# Patient Record
Sex: MALE | Race: WHITE | Employment: FULL TIME | URBAN - METROPOLITAN AREA
[De-identification: names, ages, dates, MRNs, and addresses within clinical notes are randomized per-mention and may not be internally consistent; named-entity substitution may affect disease eponyms.]

---

## 2020-01-19 ENCOUNTER — APPOINTMENT (OUTPATIENT)
Dept: GENERAL RADIOLOGY | Facility: CLINIC | Age: 40
End: 2020-01-19
Attending: PHYSICIAN ASSISTANT
Payer: COMMERCIAL

## 2020-01-19 ENCOUNTER — HOSPITAL ENCOUNTER (EMERGENCY)
Facility: CLINIC | Age: 40
Discharge: HOME | End: 2020-01-19
Attending: PHYSICIAN ASSISTANT | Admitting: PHYSICIAN ASSISTANT
Payer: COMMERCIAL

## 2020-01-19 ENCOUNTER — NURSE TRIAGE (OUTPATIENT)
Dept: NURSING | Facility: CLINIC | Age: 40
End: 2020-01-19

## 2020-01-19 VITALS
TEMPERATURE: 97.6 F | DIASTOLIC BLOOD PRESSURE: 97 MMHG | SYSTOLIC BLOOD PRESSURE: 150 MMHG | OXYGEN SATURATION: 100 % | RESPIRATION RATE: 18 BRPM

## 2020-01-19 DIAGNOSIS — S62.626A CLOSED DISPLACED FRACTURE OF MIDDLE PHALANX OF RIGHT LITTLE FINGER, INITIAL ENCOUNTER: ICD-10-CM

## 2020-01-19 PROCEDURE — 73130 X-RAY EXAM OF HAND: CPT | Mod: RT

## 2020-01-19 PROCEDURE — 99284 EMERGENCY DEPT VISIT MOD MDM: CPT | Mod: 25

## 2020-01-19 PROCEDURE — 26720 TREAT FINGER FRACTURE EACH: CPT | Mod: F9

## 2020-01-19 NOTE — ED AVS SNAPSHOT
[facility] Emergency Department  [address]  [facility] [address]  Phone:  [phone]  Fax:  [phone]                                    Johnathon Mendoza   MRN: [id number]    Department:  [facility] Emergency Department   Date of Visit:  1/19/2020           After Visit Summary Signature Page    I have received my discharge instructions, and my questions have been answered. I have discussed any challenges I see with this plan with the nurse or doctor.    ..........................................................................................................................................  Patient/Patient Representative Signature      ..........................................................................................................................................  Patient Representative Print Name and Relationship to Patient    ..................................................               ................................................  Date                                   Time    ..........................................................................................................................................  Reviewed by Signature/Title    ...................................................              ..............................................  Date                                               Time          22EPIC Rev 08/18

## 2020-01-20 NOTE — ED PROVIDER NOTES
"  History     Chief Complaint:  Hand Injury    The history is provided by the patient.      Johnathon Mendoza is a 39 year old otherwise healthy right handed male who presents alone for evaluation of a right hand injury while playing hockey earlier tonight. Patient reports that he was coming up by the hockey boards and about to exit, when he went around another player and \"must've gotten caught on something\". When he went to sit on the bench, he felt that his right fifth finger was \"out of whack.\" He states the finger now appears for straight than it did previously. He had continued swelling and pain, thus prompted his presentation. Patient has not taken anything for pain and does not report any other complaint. He has no further concerns at this time.     Allergies:  Penicillins     Medications:    The patient is not currently taking any prescribed medications.     Past Medical History:    History reviewed. No pertinent past medical history.     Past Surgical History:    History reviewed. No pertinent past surgical history.     Family History:    History reviewed. No pertinent family history.       Social History:  The patient was accompanied to the ED alone.    Review of Systems   Musculoskeletal:        Right little finger pain   All other systems reviewed and are negative.    Physical Exam     Patient Vitals for the past 24 hrs:   BP Temp Temp src Heart Rate Resp SpO2   01/19/20 2123 (!) 150/97 97.6  F (36.4  C) Oral 94 18 100 %       Physical Exam  General: Resting comfortably.  Alert and oriented.   Head:  The scalp, face, and head appear normal   Eyes:  Conjunctivae and sclerae are normal    CV:  Radial pulse intact to the right wrist.  Capillary refill is brisk in all digits of the right hand.   Resp:  No tachypnea.  No respiratory distress.  MS:  The patient can flex and extend against resistance at the MCP, DIP, and PIP joints of the right little finger. Tenderness over the PIP joint of the right little " finger. PIP joint mobility is limited 2/2 pain. No signs of complete tendon disruption. The patient can flex/entend at the MCP joint with difficulty.   Skin:  Swelling noted to the right little finger.   Neuro: Sensation intact to distal right little finger.     Emergency Department Course   Imaging:  Radiology findings were communicated with the Patient who voiced understanding of the findings.    XR Hand Right:  IMPRESSION: Fifth finger middle phalangeal base fracture is noted. There is a triangular volar fragment containing approximately 50% of the proximal interphalangeal joint surface displaced volarly and proximally approximately 0.3 cm.  Reading per radiology.     Procedures      Digital Block     PROCEDURE:  Digital Block  LOCATION:  Right fifth finger  ANESTHESIA: Digital block using 0.5% Bupivacaine without Epi, total of 3 mLs  PROCEDURE NOTE: The patient tolerated the procedure well with good relief of discomfort and there were no complications.    Emergency Department Course:  Nursing notes and vitals reviewed.    (2128)   I performed an exam of the patient as documented above. History obtained from patient.     The patient was sent for a XR Hand Right while in the emergency department, results above.      (2135)   Performed the above digital block.     (2158)   I returned to check on the patient. I explained the findings to Patient who expressed understanding of the findings.     (2209)   Shared service with Dr. Garces, please see his note for specifics. He evaluated the patient and recommends ulnar gutter orthoglass splint with no reduction. Please see his note for splint procedure specifics.     Findings and plan explained to the Patient. Patient discharged home with instructions regarding supportive care, medications, and reasons to return. The importance of close follow-up was reviewed.     I personally reviewed the imaging results with the Patient and answered all related questions prior to  discharge.    Impression & Plan      Medical Decision Making:  Johnathon Mendoza is a 39 year old otherwise healthy male, right hand dominant, who presents for evaluation of right fifth finger pain after an injury while at hockey practice prior to arrival. Details as noted in the HPI above. CMS is intact distally in the extremity.  Xray reveals a fracture to the base of the right fifth middle phalanx involving the joint space.  That does not need reduction at this time.  Given the fracture, I did ask Dr. Garces to staff this patient with me.  Please refer to his note for further details and fracture care.  The patient was placed in an ulnar gutter splint. CMS is intact after splint placement.  I did discuss the possibility of surgery given the joint involvement and he expresses understanding. No signs of complete tendon disruption. Overall, I believe the patient is safe to discharged home.  Fracture and splint precautions were discussed with the patient.  He will follow-up with orthopedics in 3 to 5 days for recheck.  He was asked to take Tylenol and ibuprofen for pain.  He will also ice and elevate to help with pain. He will return immediately for any uncontrolled pain, weakness, numbness, tingling, or any other concerns.  All questions were answered prior to discharge.  The patient understands and agrees with plan.      Diagnosis:    ICD-10-CM    1. Closed displaced fracture of middle phalanx of right little finger, initial encounter S62.626A        Disposition:   Discharged to home.    Scribe Disclosure:  I, Micaela Boucher, am serving as a scribe at 9:38 PM on 1/19/2020 to document services personally performed by Ruth Rutledge PA based on my observations and the provider's statements to me.    [facility] EMERGENCY DEPARTMENT       Ruth Rutledge PA-C  01/19/20 1838

## 2020-01-20 NOTE — ED PROVIDER NOTES
Emergency Department Attending Supervision Note  1/19/2020  10:03 PM      I evaluated this patient in conjunction with Ruth Rutledge PA      Briefly, the patient presented with a hand injury.  Patient was playing hockey this evening when he accidentally caught his pinky finger on another player and noted sudden pain in his right pinky finger.  There was a small deformity at the PIP joint.  No other injuries were noted.  Sensation is intact.    On my exam  General: Patient is alert, awake and interactive when I enter the room  Head: The scalp, face, and head appear normal  Eyes: Conjunctivae are normal  ENT: The nose is normal, Pinnae are normal, External acoustic canals are normal  Neck: Trachea midline  CV: Pulses are normal.   Resp: No respiratory distress   Musc: Tenderness to palpation of the middle phalanx of the fifth digit on his right hand.  There is a small deformity.  Flexion is intact at the DIP and PIP as well as MCP joint.  No snuffbox tenderness.  No significant tenderness to the wrist.  There is no pain with axial loading.  Skin: No rash or lesions noted  Neuro:  Speech is normal and fluent. Face is symmetric.   Psych: Normal affect.  Appropriate interactions.    XR Hand:   Fifth finger middle phalangeal base fracture is noted. There is a triangular volar fragment containing approximately 50% of the proximal interphalangeal joint surface displaced volarly and proximally approximately 0.3 cm.    My impression  Patient is a 39-year-old male who was found to have fifth finger middle phalangeal fracture.  We will place the patient in a ulnar gutter splint and have the patient follow-up with hand surgery this week.  There is no evidence of loss of flexion and sensation is intact.    Diagnosis    ICD-10-CM    1. Closed displaced fracture of middle phalanx of right little finger, initial encounter S62.626A          Jeremi Garces MD Battista, Christopher Joseph, MD  01/20/20 0044

## 2020-01-20 NOTE — TELEPHONE ENCOUNTER
"Caller reports he injured a finger playing hockey tonight \"hit the boards\"   finger  Is deformed and unable to  Flex at MIP joint   Caller advised to ice injury and be seen in ED tonight   Caller understands and will comply   Carissa Dangelo RN  [facility]      Reason for Disposition    Looks like a dislocated joint (e.g., crooked or deformed)    Additional Information    Negative: [1] Major bleeding (e.g., spurting blood) AND [2] can't be stopped    Negative: Wound looks infected    Negative: Caused by animal bite    Negative: Caused by human bite    Protocols used: FINGER INJURY-A-AH      "

## 2020-01-20 NOTE — ED TRIAGE NOTES
Reports playing hockey, when  Felt  R 5th finger possibly become dislocated. Denies feeling a pop or cracking sound.     Radial pulse intact

## 2021-04-25 ENCOUNTER — HEALTH MAINTENANCE LETTER (OUTPATIENT)
Age: 41
End: 2021-04-25

## 2021-10-09 ENCOUNTER — HEALTH MAINTENANCE LETTER (OUTPATIENT)
Age: 41
End: 2021-10-09

## 2022-05-21 ENCOUNTER — HEALTH MAINTENANCE LETTER (OUTPATIENT)
Age: 42
End: 2022-05-21

## 2022-09-17 ENCOUNTER — HEALTH MAINTENANCE LETTER (OUTPATIENT)
Age: 42
End: 2022-09-17

## 2023-06-04 ENCOUNTER — HEALTH MAINTENANCE LETTER (OUTPATIENT)
Age: 43
End: 2023-06-04

## 2023-10-30 ENCOUNTER — LAB (OUTPATIENT)
Dept: LAB | Facility: CLINIC | Age: 43
End: 2023-10-30
Payer: COMMERCIAL

## 2023-10-30 DIAGNOSIS — Z31.41 ENCOUNTER FOR SPERM COUNT FOR FERTILITY TESTING: Primary | ICD-10-CM

## 2023-10-30 LAB
ABSTINENCE DAYS: 4 DAYS (ref 2–7)
AGGLUTINATION: ABNORMAL
ANALYSIS TEMP - CENTIGRADE: 22 CENTIGRADE
COLLECTION METHOD: ABNORMAL
COLLECTION SITE: ABNORMAL
CONSENT TO RELEASE TO PARTNER: NO
DAL- RECEIVED TIME: ABNORMAL
IMMOTILE: 0 %
LIQUEFIED: YES
NON-PROGRESSIVE MOTILITY: 0 %
PROGRESSIVE MOTILITY: 0 %
ROUND CELLS: <0.1 MILLION/ML
SPECIMEN PH: 7 PH
SPECIMEN VOLUME: 1.8 ML
SPERM CONCENTRATION: 0 MILLION/ML
TIME OF ANALYSIS: ABNORMAL
TOTAL PROGRESSIVE MOTILE NUMBER: 0 MILLION
TOTAL SPERM NUMBER: 0 MILLION
VISCOUS: NO

## 2023-10-30 PROCEDURE — 89260 SPERM ISOLATION SIMPLE: CPT

## 2024-09-21 ENCOUNTER — HEALTH MAINTENANCE LETTER (OUTPATIENT)
Age: 44
End: 2024-09-21

## (undated) RX ORDER — BUPIVACAINE HYDROCHLORIDE 5 MG/ML
INJECTION, SOLUTION PERINEURAL
Status: DISPENSED
Start: 2020-01-19